# Patient Record
Sex: FEMALE | Employment: UNEMPLOYED | URBAN - METROPOLITAN AREA
[De-identification: names, ages, dates, MRNs, and addresses within clinical notes are randomized per-mention and may not be internally consistent; named-entity substitution may affect disease eponyms.]

---

## 2018-11-08 ENCOUNTER — HOSPITAL ENCOUNTER (EMERGENCY)
Age: 38
Discharge: HOME | End: 2018-11-08
Attending: EMERGENCY MEDICINE | Admitting: EMERGENCY MEDICINE
Payer: COMMERCIAL

## 2018-11-08 ENCOUNTER — APPOINTMENT (OUTPATIENT)
Dept: CT IMAGING | Age: 38
End: 2018-11-08
Attending: EMERGENCY MEDICINE
Payer: COMMERCIAL

## 2018-11-08 ENCOUNTER — APPOINTMENT (OUTPATIENT)
Dept: MRI IMAGING | Age: 38
End: 2018-11-08
Attending: EMERGENCY MEDICINE
Payer: COMMERCIAL

## 2018-11-08 VITALS
DIASTOLIC BLOOD PRESSURE: 62 MMHG | HEART RATE: 74 BPM | SYSTOLIC BLOOD PRESSURE: 119 MMHG | HEIGHT: 64 IN | BODY MASS INDEX: 31.07 KG/M2 | TEMPERATURE: 99.5 F | OXYGEN SATURATION: 100 % | RESPIRATION RATE: 18 BRPM | WEIGHT: 182 LBS

## 2018-11-08 DIAGNOSIS — G43.109 MIGRAINE WITH AURA AND WITHOUT STATUS MIGRAINOSUS, NOT INTRACTABLE: Primary | ICD-10-CM

## 2018-11-08 LAB — GLUCOSE BLD STRIP.AUTO-MCNC: 99 MG/DL (ref 70–110)

## 2018-11-08 PROCEDURE — 99284 EMERGENCY DEPT VISIT MOD MDM: CPT

## 2018-11-08 PROCEDURE — 96365 THER/PROPH/DIAG IV INF INIT: CPT

## 2018-11-08 PROCEDURE — 70450 CT HEAD/BRAIN W/O DYE: CPT

## 2018-11-08 PROCEDURE — 70553 MRI BRAIN STEM W/O & W/DYE: CPT

## 2018-11-08 PROCEDURE — 74011636320 HC RX REV CODE- 636/320: Performed by: EMERGENCY MEDICINE

## 2018-11-08 PROCEDURE — 82962 GLUCOSE BLOOD TEST: CPT

## 2018-11-08 PROCEDURE — 74011250636 HC RX REV CODE- 250/636: Performed by: EMERGENCY MEDICINE

## 2018-11-08 PROCEDURE — A9575 INJ GADOTERATE MEGLUMI 0.1ML: HCPCS | Performed by: EMERGENCY MEDICINE

## 2018-11-08 PROCEDURE — 74011250637 HC RX REV CODE- 250/637: Performed by: EMERGENCY MEDICINE

## 2018-11-08 PROCEDURE — 96375 TX/PRO/DX INJ NEW DRUG ADDON: CPT

## 2018-11-08 PROCEDURE — 96366 THER/PROPH/DIAG IV INF ADDON: CPT

## 2018-11-08 PROCEDURE — 99285 EMERGENCY DEPT VISIT HI MDM: CPT

## 2018-11-08 PROCEDURE — 93005 ELECTROCARDIOGRAM TRACING: CPT

## 2018-11-08 RX ORDER — BUTALBITAL, ACETAMINOPHEN AND CAFFEINE 50; 325; 40 MG/1; MG/1; MG/1
1 TABLET ORAL
COMMUNITY

## 2018-11-08 RX ORDER — MAGNESIUM SULFATE 1 G/100ML
1 INJECTION INTRAVENOUS
Status: COMPLETED | OUTPATIENT
Start: 2018-11-08 | End: 2018-11-08

## 2018-11-08 RX ORDER — BUTALBITAL, ACETAMINOPHEN AND CAFFEINE 300; 40; 50 MG/1; MG/1; MG/1
1 CAPSULE ORAL
Qty: 12 CAP | Refills: 0 | Status: SHIPPED | OUTPATIENT
Start: 2018-11-08

## 2018-11-08 RX ORDER — ONDANSETRON 2 MG/ML
4 INJECTION INTRAMUSCULAR; INTRAVENOUS
Status: COMPLETED | OUTPATIENT
Start: 2018-11-08 | End: 2018-11-08

## 2018-11-08 RX ORDER — ONDANSETRON 4 MG/1
4 TABLET, ORALLY DISINTEGRATING ORAL
COMMUNITY

## 2018-11-08 RX ORDER — ROPINIROLE 0.25 MG/1
0.25 TABLET, FILM COATED ORAL
COMMUNITY

## 2018-11-08 RX ORDER — ACETAMINOPHEN 10 MG/ML
1000 INJECTION, SOLUTION INTRAVENOUS ONCE
Status: COMPLETED | OUTPATIENT
Start: 2018-11-08 | End: 2018-11-08

## 2018-11-08 RX ORDER — DULOXETIN HYDROCHLORIDE 30 MG/1
60 CAPSULE, DELAYED RELEASE ORAL DAILY
COMMUNITY

## 2018-11-08 RX ORDER — LORAZEPAM 2 MG/ML
1 INJECTION INTRAMUSCULAR ONCE
Status: COMPLETED | OUTPATIENT
Start: 2018-11-08 | End: 2018-11-08

## 2018-11-08 RX ORDER — VERAPAMIL HYDROCHLORIDE 40 MG/1
40 TABLET ORAL 2 TIMES DAILY
COMMUNITY

## 2018-11-08 RX ORDER — SUMATRIPTAN 100 MG/1
100 TABLET, FILM COATED ORAL AS NEEDED
COMMUNITY

## 2018-11-08 RX ORDER — GABAPENTIN 600 MG/1
1200 TABLET ORAL 2 TIMES DAILY
COMMUNITY

## 2018-11-08 RX ORDER — DIVALPROEX SODIUM 125 MG/1
250 CAPSULE, COATED PELLETS ORAL ONCE
Status: COMPLETED | OUTPATIENT
Start: 2018-11-08 | End: 2018-11-08

## 2018-11-08 RX ORDER — DULOXETIN HYDROCHLORIDE 30 MG/1
30 CAPSULE, DELAYED RELEASE ORAL EVERY EVENING
COMMUNITY

## 2018-11-08 RX ADMIN — DIVALPROEX SODIUM 250 MG: 125 CAPSULE, COATED PELLETS ORAL at 17:12

## 2018-11-08 RX ADMIN — METHYLPREDNISOLONE SODIUM SUCCINATE 125 MG: 125 INJECTION, POWDER, FOR SOLUTION INTRAMUSCULAR; INTRAVENOUS at 14:29

## 2018-11-08 RX ADMIN — SODIUM CHLORIDE 1000 ML: 900 INJECTION, SOLUTION INTRAVENOUS at 14:29

## 2018-11-08 RX ADMIN — GADOTERATE MEGLUMINE 15 ML: 376.9 INJECTION INTRAVENOUS at 15:57

## 2018-11-08 RX ADMIN — MAGNESIUM SULFATE HEPTAHYDRATE 1 G: 1 INJECTION, SOLUTION INTRAVENOUS at 14:29

## 2018-11-08 RX ADMIN — ONDANSETRON 4 MG: 2 INJECTION INTRAMUSCULAR; INTRAVENOUS at 14:29

## 2018-11-08 RX ADMIN — ACETAMINOPHEN 1000 MG: 10 INJECTION, SOLUTION INTRAVENOUS at 15:09

## 2018-11-08 RX ADMIN — LORAZEPAM 1 MG: 2 INJECTION INTRAMUSCULAR; INTRAVENOUS at 14:58

## 2018-11-08 NOTE — ED PROVIDER NOTES
EMERGENCY DEPARTMENT HISTORY AND PHYSICAL EXAM 
 
Date: 11/8/2018 Patient Name: Cynthia Hernandez History of Presenting Illness Chief Complaint Patient presents with  Numbness  Headache History Provided By: Patient Chief Complaint: headache Duration: last night Timing:  Constant Location: left head Quality: Vijay Helm Severity: Severe Modifying Factors: Fioricet without relief Associated Symptoms: right sided paresthesias Additional History (Context):  
1:06 PM  
Meron Brown is a 41 y.o. female with PMHX of migraines who presents to the emergency department C/O sudden onset of constant, severe, sharp left frontal headache starting last night. She has taken Fioricet without relief. Associated sxs include right sided paraesthesias and blurred vision. States her headache does not feel like her typical migraines which usually cause right sided headache. Pt denies any other sxs or complaints. PCP: Trisha Espinosa MD 
 
Current Facility-Administered Medications Medication Dose Route Frequency Provider Last Rate Last Dose  divalproex (DEPAKOTE SPRINKLE) capsule 250 mg  250 mg Oral ONCE Guido Jha DO      
 
Current Outpatient Medications Medication Sig Dispense Refill  gabapentin (NEURONTIN) 600 mg tablet Take 1,200 mg by mouth two (2) times a day.  DULoxetine (CYMBALTA) 30 mg capsule Take 30 mg by mouth every evening. 60mg am and 30 mg in pm    
 DULoxetine (CYMBALTA) 30 mg capsule Take 60 mg by mouth daily. 60mg am and 30 mg in pm    
 rOPINIRole (REQUIP) 0.25 mg tablet Take 0.25 mg by mouth nightly as needed.  verapamil (CALAN) 40 mg tablet Take 40 mg by mouth two (2) times a day.  butalbital-acetaminophen-caffeine (FIORICET, ESGIC) -40 mg per tablet Take 1 Tab by mouth every twelve (12) hours as needed for Pain.  ondansetron (ZOFRAN ODT) 4 mg disintegrating tablet Take 4 mg by mouth every eight (8) hours as needed for Nausea.  SUMAtriptan (IMITREX) 100 mg tablet Take 100 mg by mouth as needed for Migraine.  multivitamin (ONE A DAY) tablet Take 1 Tab by mouth daily. Past History Past Medical History: 
Past Medical History:  
Diagnosis Date  Fibromyalgia  GERD (gastroesophageal reflux disease)  Lupus  Migraines  Morbid obesity ([address]  Other ill-defined conditions(799.89) migraine  Other malaise and fatigue  Shortness of breath Past Surgical History: 
Past Surgical History:  
Procedure Laterality Date  ABDOMEN SURGERY PROC UNLISTED    
 hernia  HX CHOLECYSTECTOMY  HX HERNIA REPAIR    
 HX ORTHOPAEDIC    
 left knee replacement  HX OTHER SURGICAL    
 gastric bypass  HX PARTIAL HYSTERECTOMY  LAP GASTRIC BYPASS/CHANDA-EN-Y  9/18/07 Dr. Tyler Proctor Family History: 
History reviewed. No pertinent family history. Social History: 
Social History Tobacco Use  Smoking status: Current Every Day Smoker Packs/day: 0.50  Smokeless tobacco: Never Used Substance Use Topics  Alcohol use: No  
 Drug use: No  
 
 
Allergies: Allergies Allergen Reactions  Amoxicillin Anaphylaxis Tolerates Clindamycin  Benadryl [Diphenhydramine Hcl] Anaphylaxis  Codeine Anaphylaxis  Compazine [Prochlorperazine Edisylate] Anaphylaxis  Keflex [Cephalexin] Anaphylaxis Tolerates Clindamycin  Penicillins Anaphylaxis Tolerates Clindamycin  Phenergan [Promethazine] Anaphylaxis  Reglan [Metoclopramide] Anaphylaxis  Sulfamethoxazole Anaphylaxis Tolerates Clindamycin  Tramadol Seizures  Ciprofloxacin Rash Tolerates Clindamycin  Adhesive Rash  Flexeril [Cyclobenzaprine] Other (comments) Dystonia  Lortab [Hydrocodone-Acetaminophen] Swelling  Nsaids (Non-Steroidal Anti-Inflammatory Drug) Other (comments) Hx of gastric bypass surgery. Patient reports GI bleeding and ulcers with NSAIDS. Review of Systems Review of Systems Eyes: Positive for visual disturbance (blurred vision). Neurological: Positive for numbness (right sided paresthesias) and headaches (left). All other systems reviewed and are negative. Physical Exam  
 
Vitals:  
 11/08/18 1310 11/08/18 1612 BP: 117/72 119/62 Pulse: 89 74 Resp: 16 18 Temp: 99.5 °F (37.5 °C) SpO2: 100% 100% Weight: 82.6 kg (182 lb) Height: 5' 4\" (1.626 m) Physical Exam 
Constitutional: Young  female, appears anxious Head: Normocephalic, Atraumatic Eyes: Pupils are equal, round, and reactive to light and 3 mm bilaterally, EOMI, no scleral icterus, no conjunctival pallor, no nystagmus ENT: moist mucous membranes, hearing intact Neck: Supple, non-tender Cardiovascular: Regular rate and rhythm, no murmurs, rubs, or gallops Chest: Normal work of breathing and chest excursion bilaterally Lungs: Clear to ausculation bilaterally, no wheezes, no rhonchi Abdomen: Soft, non tender, non distended, normoactive bowel sounds Back: No evidence of trauma or deformity Extremities: No evidence of trauma or deformity, no LE edema Skin: Warm and dry, normal cap refill Neuro: Awake, alert, and oriented with right upper extremity strength diminished. However, appearing to be volitional with a drift. Finger to nose: unable to cooperate with exam.  
Psychiatric: Cooperative, appropriate mood Diagnostic Study Results Labs - Recent Results (from the past 12 hour(s)) GLUCOSE, POC Collection Time: 11/08/18  1:04 PM  
Result Value Ref Range Glucose (POC) 99 70 - 110 mg/dL EKG, 12 LEAD, INITIAL Collection Time: 11/08/18  1:09 PM  
Result Value Ref Range Ventricular Rate 88 BPM  
 Atrial Rate 88 BPM  
 P-R Interval 140 ms QRS Duration 94 ms Q-T Interval 346 ms  
 QTC Calculation (Bezet) 418 ms Calculated P Axis 12 degrees Calculated R Axis 28 degrees Calculated T Axis 24 degrees Diagnosis Normal sinus rhythm Normal ECG When compared with ECG of 06-MAY-2013 22:12, No significant change was found Radiologic Studies -  
MRI BRAIN W WO CONT Final Result IMPRESSION: 
 
1.  No acute infarct, hemorrhage, mass effect, or herniation. 2. Isolated left posterior deep hemispheric white matter T2/FLAIR hyperintense 
white matter focus, quite nonspecific and not suggestive of a demyelinating 
process.  Similar findings may be seen in patient's of all ages.   
 
3. Mild paranasal sinus disease without air-fluid level. 4. Otherwise unremarkable MR brain.   
As read by the radiologist.   
Saad Pilar CONT Final Result IMPRESSION: 
 
 
1. No acute intracranial abnormality. Stable examination. CRITICAL RESULT: CODE S findings called to Dr. Beverly Irwin in the emergency room 
prior to dictation at the time of the scan. As read by the radiologist.   
 
CT Results  (Last 48 hours) 11/08/18 1321  CT HEAD WO CONT Final result Impression:  IMPRESSION:  
   
   
1. No acute intracranial abnormality. Stable examination. CRITICAL RESULT: CODE S findings called to Dr. Beverly Irwin in the emergency room  
prior to dictation at the time of the scan. Narrative:  EXAM: CT head INDICATION: Suspected stroke. COMPARISON: 11/17/2013 TECHNIQUE: Axial CT imaging of the head was performed without intravenous  
contrast.  
   
One or more dose reduction techniques were used on this CT: automated exposure  
control, adjustment of the mAs and/or kVp according to patient's size, and  
iterative reconstruction techniques. The specific techniques utilized on this CT  
exam have been documented in the patient's electronic medical record.   
   
_______________ FINDINGS:  
   
BRAIN AND POSTERIOR FOSSA: The sulci, folia, ventricles and basal cisterns are  
within normal limits for the patient?s age.  There is no intracranial hemorrhage,  
 mass effect, or midline shift. The gray-white matter differentiation is within  
normal limits. Small dilated perivascular space inferior to the right basal  
ganglia, unchanged. EXTRA-AXIAL SPACES AND MENINGES: There are no abnormal extra-axial fluid  
collections. CALVARIUM: Intact. SINUSES: Clear. OTHER: Incomplete fusion of the posterior ring of C1 redemonstrated.  
   
_______________ CXR Results  (Last 48 hours) None Medications given in the ED- Medications  
divalproex (DEPAKOTE SPRINKLE) capsule 250 mg (not administered)  
sodium chloride 0.9 % bolus infusion 1,000 mL (0 mL IntraVENous IV Completed 11/8/18 1529) ondansetron (ZOFRAN) injection 4 mg (4 mg IntraVENous Given 11/8/18 1429) methylPREDNISolone (PF) (SOLU-MEDROL) injection 125 mg (125 mg IntraVENous Given 11/8/18 1429)  
magnesium sulfate 1 g/100 ml IVPB (premix or compounded) (0 g IntraVENous IV Completed 11/8/18 1635) LORazepam (ATIVAN) injection 1 mg (1 mg IntraVENous Given 11/8/18 1458)  
acetaminophen (OFIRMEV) infusion 1,000 mg (0 mg IntraVENous IV Completed 11/8/18 1522) gadoterate meglumine (DOTAREM) 0.5 mmol/mL contrast solution syringe 15 mL (15 mL IntraVENous Given 11/8/18 6257) Medical Decision Making I am the first provider for this patient. I reviewed the vital signs, available nursing notes, past medical history, past surgical history, family history and social history. Vital Signs-Reviewed the patient's vital signs. Pulse Oximetry Analysis - 100% on room air Cardiac Monitor: 
Rate: 78 bpm 
Rhythm: NSR 
 
EKG interpretation: (Preliminary) 1:09 PM  
NSR. Rate 88 bpm. QTc: 418 ms. No ST elevation. EKG read by General Napoleon DO at 1:11 PM  
 
Records Reviewed: Nursing Notes and Old Medical Records Provider Notes (Medical Decision Making): 41year old female with Hx of migraine headaches with prior ED visit for migraine headaches who presents of left headache and right head to toe numbness and tingling. She is awake, alert, and oriented. The patient has right sided weakness which appears volitional. She could not follow finger to nose and is indeterminate due to patient cooperation. Code S called and Code S protocol initiated prior to my assessment at Triage. Procedures: 
Procedures ED Course:  
1:06 PM Initial assessment performed. Code S called and protocol was initiated immediately prior to this assessment. 1:45 PM Alessandra Carney PA-C discussed patient's history, exam, and available diagnostics results with Dr. Duke Sherman (Teleneurology), who feels like the patient is a complicated migraine. Recommends treatment for such but states the patient should have a MRI with contrast while in the department. Rashard Clark 5:05 PM MRI negative for acute findings. Pt given migraine cocktail and Depakote as she has multiple allergies. Will DC with Neurology fu and rx for Fioricet. Diagnosis and Disposition DISCHARGE NOTE: 
5:08 PM  
Merno Brown's  results have been reviewed with her. She has been counseled regarding her diagnosis, treatment, and plan. She verbally conveys understanding and agreement of the signs, symptoms, diagnosis, treatment and prognosis and additionally agrees to follow up as discussed. She also agrees with the care-plan and conveys that all of her questions have been answered. I have also provided discharge instructions for her that include: educational information regarding their diagnosis and treatment, and list of reasons why they would want to return to the ED prior to their follow-up appointment, should her condition change. She has been provided with education for proper emergency department utilization. CLINICAL IMPRESSION: 
 
1. Migraine with aura and without status migrainosus, not intractable PLAN: 
1. D/C Home 2. Current Discharge Medication List  
  
 
3. Follow-up Information Follow up With Specialties Details Why Contact Info Jean Carlos Perdomo MD Family Practice Schedule an appointment as soon as possible for a visit in 2 days For primary care follow [place] [id number] [facility] 
[phone] Melida Kirby MD Neurology Schedule an appointment as soon as possible for a visit in 2 days For neurology [facility] [address] [facility] [facility] 
[phone] [facility] EMERGENCY DEPT Emergency Medicine  As needed, If symptoms worsen [address] [id number] 
[phone]  
  
 
_______________________________ Attestations: This note is prepared by Bibiana Shepherd, acting as Scribe for General Napoleon DO. General Napoleon DO:  The scribe's documentation has been prepared under my direction and personally reviewed by me in its entirety. I confirm that the note above accurately reflects all work, treatment, procedures, and medical decision making performed by me. This note is prepared by Enio Barnes, acting as Scribe for Maria Fernanda Bryant PA-C. Maria Fernanda Bryant PA-C: The scribe's documentation has been prepared under my direction and personally reviewed by me in its entirety. I confirm that the note above accurately reflects all work, treatment, procedures, and medical decision making performed by me.   
_______________________________

## 2018-11-08 NOTE — DISCHARGE INSTRUCTIONS
Migraine Headache: Care Instructions  Your Care Instructions  Migraines are painful, throbbing headaches that often start on one side of the head. They may cause nausea and vomiting and make you sensitive to light, sound, or smell. Without treatment, migraines can last from 4 hours to a few days. Medicines can help prevent migraines or stop them after they have started. Your doctor can help you find which ones work best for you. Follow-up care is a key part of your treatment and safety. Be sure to make and go to all appointments, and call your doctor if you are having problems. It's also a good idea to know your test results and keep a list of the medicines you take. How can you care for yourself at home? · Do not drive if you have taken a prescription pain medicine. · Rest in a quiet, dark room until your headache is gone. Close your eyes, and try to relax or go to sleep. Don't watch TV or read. · Put a cold, moist cloth or cold pack on the painful area for 10 to 20 minutes at a time. Put a thin cloth between the cold pack and your skin. · Use a warm, moist towel or a heating pad set on low to relax tight shoulder and neck muscles. · Have someone gently massage your neck and shoulders. · Take your medicines exactly as prescribed. Call your doctor if you think you are having a problem with your medicine. You will get more details on the specific medicines your doctor prescribes. · Be careful not to take pain medicine more often than the instructions allow. You could get worse or more frequent headaches when the medicine wears off. To prevent migraines  · Keep a headache diary so you can figure out what triggers your headaches. Avoiding triggers may help you prevent headaches. Record when each headache began, how long it lasted, and what the pain was like.  (Was it throbbing, aching, stabbing, or dull?) Write down any other symptoms you had with the headache, such as nausea, flashing lights or dark spots, or sensitivity to bright light or loud noise. Note if the headache occurred near your period. List anything that might have triggered the headache. Triggers may include certain foods (chocolate, cheese, wine) or odors, smoke, bright light, stress, or lack of sleep. · If your doctor has prescribed medicine for your migraines, take it as directed. You may have medicine that you take only when you get a migraine and medicine that you take all the time to help prevent migraines. ? If your doctor has prescribed medicine for when you get a headache, take it at the first sign of a migraine, unless your doctor has given you other instructions. ? If your doctor has prescribed medicine to prevent migraines, take it exactly as prescribed. Call your doctor if you think you are having a problem with your medicine. · Find healthy ways to deal with stress. Migraines are most common during or right after stressful times. Take time to relax before and after you do something that has caused a migraine in the past.  · Try to keep your muscles relaxed by keeping good posture. Check your jaw, face, neck, and shoulder muscles for tension. Try to relax them. When you sit at a desk, change positions often. And make sure to stretch for 30 seconds each hour. · Get plenty of sleep and exercise. · Eat meals on a regular schedule. Avoid foods and drinks that often trigger migraines. These include chocolate, alcohol (especially red wine and port), aspartame, monosodium glutamate (MSG), and some additives found in foods (such as hot dogs, kumar, cold cuts, aged cheeses, and pickled foods). · Limit caffeine. Don't drink too much coffee, tea, or soda. But don't quit caffeine suddenly. That can also give you migraines. · Do not smoke or allow others to smoke around you. If you need help quitting, talk to your doctor about stop-smoking programs and medicines. These can increase your chances of quitting for good.   · If you are taking birth control pills or hormone therapy, talk to your doctor about whether they are triggering your migraines. When should you call for help? Call 911 anytime you think you may need emergency care. For example, call if:    · You have signs of a stroke. These may include:  ? Sudden numbness, paralysis, or weakness in your face, arm, or leg, especially on only one side of your body. ? Sudden vision changes. ? Sudden trouble speaking. ? Sudden confusion or trouble understanding simple statements. ? Sudden problems with walking or balance. ? A sudden, severe headache that is different from past headaches.    Call your doctor now or seek immediate medical care if:    · You have new or worse nausea and vomiting.     · You have a new or higher fever.     · Your headache gets much worse.    Watch closely for changes in your health, and be sure to contact your doctor if:    · You are not getting better after 2 days (48 hours). Where can you learn more? Go to http://ney-sammi.info/. Enter G060 in the search box to learn more about \"Migraine Headache: Care Instructions. \"  Current as of: June 4, 2018  Content Version: 11.8  © 2057-8626 [employer]. Care instructions adapted under license by [employer] (which disclaims liability or warranty for this information). If you have questions about a medical condition or this instruction, always ask your healthcare professional. [address] any warranty or liability for your use of this information.

## 2018-11-08 NOTE — ED TRIAGE NOTES
Patient with complaints of of a headache that started last night . Patient states that she started with numbness and tingling to the whole right side about 2 hours PTA

## 2018-12-25 LAB
ATRIAL RATE: 88 BPM
CALCULATED P AXIS, ECG09: 12 DEGREES
CALCULATED R AXIS, ECG10: 28 DEGREES
CALCULATED T AXIS, ECG11: 24 DEGREES
DIAGNOSIS, 93000: NORMAL
P-R INTERVAL, ECG05: 140 MS
Q-T INTERVAL, ECG07: 346 MS
QRS DURATION, ECG06: 94 MS
QTC CALCULATION (BEZET), ECG08: 418 MS
VENTRICULAR RATE, ECG03: 88 BPM